# Patient Record
Sex: MALE | Race: WHITE | Employment: OTHER | URBAN - METROPOLITAN AREA
[De-identification: names, ages, dates, MRNs, and addresses within clinical notes are randomized per-mention and may not be internally consistent; named-entity substitution may affect disease eponyms.]

---

## 2021-06-11 ENCOUNTER — HOSPITAL ENCOUNTER (EMERGENCY)
Age: 71
Discharge: HOME | End: 2021-06-11
Attending: EMERGENCY MEDICINE | Admitting: EMERGENCY MEDICINE
Payer: MEDICARE

## 2021-06-11 VITALS
DIASTOLIC BLOOD PRESSURE: 76 MMHG | HEART RATE: 54 BPM | HEIGHT: 70 IN | SYSTOLIC BLOOD PRESSURE: 135 MMHG | BODY MASS INDEX: 24.05 KG/M2 | WEIGHT: 167.99 LBS | OXYGEN SATURATION: 98 % | RESPIRATION RATE: 15 BRPM | TEMPERATURE: 98.4 F

## 2021-06-11 DIAGNOSIS — L03.011 PARONYCHIA OF FINGER OF RIGHT HAND: Primary | ICD-10-CM

## 2021-06-11 PROCEDURE — 99283 EMERGENCY DEPT VISIT LOW MDM: CPT

## 2021-06-11 PROCEDURE — 75810000289 HC I&D ABSCESS SIMP/COMP/MULT

## 2021-06-11 PROCEDURE — 74011250637 HC RX REV CODE- 250/637: Performed by: EMERGENCY MEDICINE

## 2021-06-11 RX ORDER — IBUPROFEN 600 MG/1
600 TABLET ORAL
Status: COMPLETED | OUTPATIENT
Start: 2021-06-11 | End: 2021-06-11

## 2021-06-11 RX ORDER — ACETAMINOPHEN 500 MG
1000 TABLET ORAL ONCE
Status: COMPLETED | OUTPATIENT
Start: 2021-06-11 | End: 2021-06-11

## 2021-06-11 RX ADMIN — IBUPROFEN 600 MG: 600 TABLET, FILM COATED ORAL at 13:21

## 2021-06-11 RX ADMIN — ACETAMINOPHEN 1000 MG: 500 TABLET ORAL at 13:21

## 2021-06-11 NOTE — ED NOTES
Dr. Sharma  reviewed discharge instructions with the patient. The patient verbalized understanding. Patient ambulated out of the emergency department without assistance. Patient is in no apparent distress.

## 2021-06-11 NOTE — ED PROVIDER NOTES
72-year-old male with a history of psoriatic arthritis noticed the tip of his right long finger, his dominant side, got swollen and red and hurts some to the touch over the last several days. He cannot think of any specific injury. He had been doing some gardening around thorns, [employer] not roses, but he does not believe he punctured his finger. There is no open wound. He is able to move his hand around but when he presses on it it hurts. No nausea or fever or other systemic symptoms. No immunosuppression. He does not bite his nails. Past Medical History:   Diagnosis Date    PSA (psoriatic arthritis) ([facility] [address]        No past surgical history on file. No family history on file. Social History     Socioeconomic History    Marital status:      Spouse name: Not on file    Number of children: Not on file    Years of education: Not on file    Highest education level: Not on file   Occupational History    Not on file   Tobacco Use    Smoking status: Never Smoker    Smokeless tobacco: Never Used   Substance and Sexual Activity    Alcohol use: Not on file    Drug use: Not on file    Sexual activity: Not on file   Other Topics Concern    Not on file   Social History Narrative    Not on file     Social Determinants of Health     Financial Resource Strain:     Difficulty of Paying Living Expenses:    Food Insecurity:     Worried About Running Out of Food in the Last Year:     [address] in the Last Year:    Transportation Needs:     Lack of Transportation (Medical):      Lack of Transportation (Non-Medical):    Physical Activity:     Days of Exercise per Week:     Minutes of Exercise per Session:    Stress:     Feeling of Stress :    Social Connections:     Frequency of Communication with Friends and Family:     Frequency of Social Gatherings with Friends and Family:     Attends [employer] Services:     Active Member of Clubs or Organizations:     Attends Club or Organization Meetings:     Marital Status:    Intimate Partner Violence:     Fear of Current or Ex-Partner:     Emotionally Abused:     Physically Abused:     Sexually Abused: ALLERGIES: Patient has no known allergies. Review of Systems   Constitutional: Negative for fever. HENT: Negative for trouble swallowing. Eyes: Negative for visual disturbance. Respiratory: Negative for cough. Cardiovascular: Negative for chest pain. Gastrointestinal: Negative for abdominal pain. Genitourinary: Negative for difficulty urinating. Musculoskeletal: Negative for gait problem. Skin: Negative for rash. Neurological: Negative for headaches. Hematological: Does not bruise/bleed easily. Psychiatric/Behavioral: Negative for sleep disturbance. Vitals:    06/11/21 1246   BP: 135/76   Pulse: (!) 54   Resp: 15   Temp: 98.4 °F (36.9 °C)   SpO2: 98%   Weight: 76.2 kg (167 lb 15.9 oz)   Height: 5' 10\" (1.778 m)            Physical Exam  Constitutional:       Appearance: Normal appearance. HENT:      Head: Normocephalic. Nose: Nose normal.      Mouth/Throat:      Mouth: Mucous membranes are moist.   Eyes:      Extraocular Movements: Extraocular movements intact. Conjunctiva/sclera: Conjunctivae normal.   Cardiovascular:      Rate and Rhythm: Normal rate. Pulmonary:      Effort: Pulmonary effort is normal. No respiratory distress. Abdominal:      General: Abdomen is flat. Musculoskeletal:         General: Normal range of motion. Skin:     Findings: No rash. Neurological:      General: No focal deficit present. Mental Status: He is alert.    Psychiatric:         Behavior: Behavior normal.          [facility]       I&D Abcess Simple    Date/Time: 6/11/2021 2:24 PM  Performed by: Kiara Car DO  Authorized by: Kiara Car DO     Consent:     Consent obtained:  Verbal    Consent given by:  Patient    Risks discussed:  Bleeding, infection, incomplete drainage and pain Alternatives discussed:  No treatment  Location:     Type:  Abscess    Location:  Upper extremity    Upper extremity location:  Finger    Finger location:  R long finger  Pre-procedure details:     Skin preparation:  Chloraprep  Anesthesia (see MAR for exact dosages): Anesthesia method:  None  Procedure type:     Complexity:  Simple  Procedure details:     Needle aspiration: no      Incision types:  Stab incision    Incision depth:  Subcutaneous    Scalpel blade:  11    Wound management:  Irrigated with saline    Drainage:  Bloody and purulent    Drainage amount:  Scant    Wound treatment:  Wound left open    Packing materials:  None  Post-procedure details:     Patient tolerance of procedure:   Tolerated well, no immediate complications

## 2021-06-11 NOTE — ED TRIAGE NOTES
Patient presents to the emergency department reporting right middle finger pain and swelling. Patient states he noticed tenderness on Sunday, and the pain has intensified and has swelled since. Patient is unaware of any obvious injury.